# Patient Record
Sex: MALE | Race: WHITE | NOT HISPANIC OR LATINO | Employment: STUDENT | URBAN - METROPOLITAN AREA
[De-identification: names, ages, dates, MRNs, and addresses within clinical notes are randomized per-mention and may not be internally consistent; named-entity substitution may affect disease eponyms.]

---
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
## 2020-06-16 ENCOUNTER — OFFICE VISIT (OUTPATIENT)
Dept: ORTHOPEDIC SURGERY | Facility: CLINIC | Age: 20
End: 2020-06-16

## 2020-06-16 VITALS
HEIGHT: 73 IN | HEART RATE: 52 BPM | SYSTOLIC BLOOD PRESSURE: 133 MMHG | BODY MASS INDEX: 27.7 KG/M2 | DIASTOLIC BLOOD PRESSURE: 83 MMHG | WEIGHT: 209 LBS

## 2020-06-16 DIAGNOSIS — S63.621A SPRAIN OF INTERPHALANGEAL JOINT OF RIGHT THUMB, INITIAL ENCOUNTER: Primary | ICD-10-CM

## 2020-06-16 PROCEDURE — 99203 OFFICE O/P NEW LOW 30 MIN: CPT | Performed by: FAMILY MEDICINE

## 2020-06-16 NOTE — PROGRESS NOTES
"Primary Care Sports Medicine Office Visit Note     Patient ID: Nico Gamboa is a 19 y.o. male.    Chief Complaint:  Chief Complaint   Patient presents with   • Right Hand - Pain     HPI:    Mr. Nico Gamboa is a 19 y.o. male who presents to the clinic today for R thumb injury. Pt states he was playing baseball yesterday afternoon. Is a catcher, and free hand (R hand) was resting on his thigh, when the batter tipped the ball. Ball struck him in the tip of the R thumb, jamming the digit into his MTP, against extension resistance. Moderate pain, bruising to the thumb near immediately. Now difficulty with extending first interphalangeal joint.    History reviewed. No pertinent past medical history.    Past Surgical History:   Procedure Laterality Date   • HERNIA REPAIR         No family history on file.  Social History     Occupational History   • Not on file   Tobacco Use   • Smoking status: Never Smoker   • Smokeless tobacco: Never Used   Substance and Sexual Activity   • Alcohol use: Defer     Frequency: Never   • Drug use: Defer   • Sexual activity: Defer      Review of Systems   Constitutional: Negative for activity change and fever.   Respiratory: Negative for cough and shortness of breath.    Cardiovascular: Negative for chest pain.   Gastrointestinal: Negative for constipation, diarrhea, nausea and vomiting.   Musculoskeletal: Positive for arthralgias.   Skin: Negative for color change and rash.   Neurological: Negative for weakness.   Hematological: Does not bruise/bleed easily.       Objective:    /83   Pulse 52   Ht 185.4 cm (73\")   Wt 94.8 kg (209 lb)   BMI 27.57 kg/m²     Physical Examination:  Physical Exam   Constitutional: He appears well-developed and well-nourished. No distress.   HENT:   Head: Normocephalic and atraumatic.   Eyes: Conjunctivae are normal.   Cardiovascular: Intact distal pulses.   Pulmonary/Chest: Effort normal. No respiratory distress.   Neurological: He is alert. " "  Skin: Skin is warm. Capillary refill takes less than 2 seconds. He is not diaphoretic.   Nursing note and vitals reviewed.    Ortho Exam  Imaging and other tests:  Outside images 3 view of the wrist and hand XR dated 6/15/2020 yields no obvious fracture per my interpretation.  Essentially negative XR.    Assessment and Plan:    1. Sprain of interphalangeal joint of right thumb, initial encounter    I discussed with the patient today that reviewing his imaging, I do not feel that he has an acute or obvious fracture that I can tell.  I would like for him to initially wear fracture brace (Exos) for the next 2 weeks.  We can repeat imaging at that time.  If no obvious new bony turnover is apparent, after 2 weeks of rest, ice, NSAIDs, he likely can return to sport without any further intervention.  Likely simple sprain of interphalangeal joint.  RTC in 2 weeks for repeat imaging.    Rudi NAIK \"Chance\" Marcelo SALOMON DO, CAQSM  06/23/20  11:02    Disclaimer: Please note that areas of this note were completed with computer voice recognition software.  Quite often unanticipated grammatical, syntax, homophones, and other interpretive errors are inadvertently transcribed by the computer software. Please excuse any errors that have escaped final proofreading.  "

## 2020-06-29 ENCOUNTER — OFFICE VISIT (OUTPATIENT)
Dept: ORTHOPEDIC SURGERY | Facility: CLINIC | Age: 20
End: 2020-06-29

## 2020-06-29 VITALS
TEMPERATURE: 97.8 F | SYSTOLIC BLOOD PRESSURE: 125 MMHG | HEART RATE: 56 BPM | RESPIRATION RATE: 16 BRPM | BODY MASS INDEX: 27.83 KG/M2 | WEIGHT: 210 LBS | HEIGHT: 73 IN | DIASTOLIC BLOOD PRESSURE: 75 MMHG | OXYGEN SATURATION: 97 %

## 2020-06-29 DIAGNOSIS — S63.621A SPRAIN OF INTERPHALANGEAL JOINT OF RIGHT THUMB, INITIAL ENCOUNTER: Primary | ICD-10-CM

## 2020-06-29 PROCEDURE — 99213 OFFICE O/P EST LOW 20 MIN: CPT | Performed by: FAMILY MEDICINE

## 2020-06-29 NOTE — PROGRESS NOTES
"Primary Care Sports Medicine Office Visit Note     Patient ID: Nico Gamboa is a 19 y.o. male.    Chief Complaint:  Chief Complaint   Patient presents with   • Right Thumb - Follow-up     HPI:    Mr. Nico Gamboa is a 19 y.o. male who presents to the clinic today for follow-up evaluation and repeat imaging of right first distal phalanx injury.  The patient was playing baseball near 2 weeks ago, and was struck in the tip of the thumb while playing catcher.  He denied any weakness or strength loss to any motion of that finger, but had a significantly large subungual hematoma and a moderate amount of pain to palpation of the base of the distal phalanx.  He returns today for follow-up imaging as his first imaging was inconclusive/negative for fracture.  He was placed in a thumb spica brace, and has done incredibly well.  He states today he has full function of the thumb with no pain, weakness, neurologic symptom, or other problems.    No past medical history on file.    Past Surgical History:   Procedure Laterality Date   • HERNIA REPAIR         No family history on file.  Social History     Occupational History   • Not on file   Tobacco Use   • Smoking status: Never Smoker   • Smokeless tobacco: Never Used   Substance and Sexual Activity   • Alcohol use: Defer     Frequency: Never   • Drug use: Defer   • Sexual activity: Defer      Review of Systems   Constitutional: Negative for activity change, fatigue and fever.   Musculoskeletal: Negative for arthralgias.   Skin: Negative for color change and rash.   Neurological: Negative for numbness.       Objective:    /75 (BP Location: Left arm, Patient Position: Sitting)   Pulse 56   Temp 97.8 °F (36.6 °C)   Resp 16   Ht 185.4 cm (73\")   Wt 95.3 kg (210 lb)   SpO2 97%   BMI 27.71 kg/m²     Physical Examination:  Physical Exam   Constitutional: He appears well-developed and well-nourished. No distress.   HENT:   Head: Normocephalic and atraumatic. " "  Eyes: Conjunctivae are normal.   Cardiovascular: Intact distal pulses.   Pulmonary/Chest: Effort normal. No respiratory distress.   Neurological: He is alert.   Skin: Skin is warm. Capillary refill takes less than 2 seconds. He is not diaphoretic.   Nursing note and vitals reviewed.    Right Hand Exam     Tenderness   The patient is experiencing no tenderness.     Range of Motion   Hand   MP Thumb: normal   DIP Thumb: normal     Comments:  5/5 strength to thumb flexion/extension, abduction/adduction. Reabsorbing bruising and subungual hematoma of the first nailbed.          Imaging and other tests:  Repeat three-view XR of the right first digit again yields no obvious fracture, malalignment, or dislocation.  Essentially negative XR.    Assessment and Plan:    1. Sprain of interphalangeal joint of right thumb, initial encounter  - XR Finger 2+ View Right    With negative follow-up x-ray, this likely is simply interphalangeal joint sprain, with subungual hematoma.  The patient has done incredibly well with bracing.  I recommend he discontinue the brace today, ibuprofen as needed, RTC PRN.    Addendum: After this patient had left the office and upon further review, radiology read returned result from XR of very small nondisplaced fracture of distal phalanx.   Vivek called the patient and discussed these findings with him.  I would like for him to continue wearing his Exos fracture brace for another 4 weeks, and return to clinic at that time for repeat evaluation.    Rudi NAIK \"Chance\" Marcelo SALOMON DO, CAQSM  06/29/20  11:57    Disclaimer: Please note that areas of this note were completed with computer voice recognition software.  Quite often unanticipated grammatical, syntax, homophones, and other interpretive errors are inadvertently transcribed by the computer software. Please excuse any errors that have escaped final proofreading.  "

## 2021-02-09 ENCOUNTER — OFFICE VISIT (OUTPATIENT)
Dept: ORTHOPEDIC SURGERY | Facility: CLINIC | Age: 21
End: 2021-02-09

## 2021-02-09 VITALS
SYSTOLIC BLOOD PRESSURE: 140 MMHG | HEIGHT: 73 IN | WEIGHT: 225 LBS | DIASTOLIC BLOOD PRESSURE: 86 MMHG | HEART RATE: 65 BPM | BODY MASS INDEX: 29.82 KG/M2

## 2021-02-09 DIAGNOSIS — S69.91XA INJURY OF RIGHT HAND, INITIAL ENCOUNTER: Primary | ICD-10-CM

## 2021-02-09 PROCEDURE — 99213 OFFICE O/P EST LOW 20 MIN: CPT | Performed by: ORTHOPAEDIC SURGERY

## 2021-02-09 NOTE — PROGRESS NOTES
"Chief Complaint  Follow-up of the Right Hand    Subjective    History of Present Illness      Nico Gamboa is a 20 y.o. male who presents to [facility] for right hand pain after a sports injury.  History of Present Illness this is a young man who is attending [employer].  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  He was injured about 4 days ago when he was swinging a bat with significant velocity.  He had to check his swing which led to immediate pain after a pop on the medial aspect of his wrist.  He has difficulty with  strength.  He has difficulty with ulnar deviation of the wrist.  I am definitely concerned about an injury to the ulnar collateral ligament versus the hook of the hamate.  Pain Location:  RIGHT wrist  Radiation: none  Quality: sharp, stabbing  Intensity/Severity: moderate  Duration: 4 days  Progression of symptoms: no worsening, symptoms stable/unchanged  Onset quality: sudden  Timing: constant  Aggravating Factors: repetitive motion  Alleviating Factors: Tylenol, ice  Previous Episodes: none mentioned  Associated Symptoms: pain, swelling, decreased strength  ADLs Affected: work related activities, recreational activities/sports  Previous Treatment: NSAIDs       Objective   Vital Signs:   /86   Pulse 65   Ht 185.4 cm (73\")   Wt 102 kg (225 lb)   BMI 29.69 kg/m²     Physical Exam  Physical Exam  Vitals signs and nursing note reviewed.   Constitutional:       Appearance: Normal appearance.   Pulmonary:      Effort: Pulmonary effort is normal.   Skin:     General: Skin is warm and dry.      Capillary Refill: Capillary refill takes less than 2 seconds.   Neurological:      General: No focal deficit present.      Mental Status: He is alert and oriented to person, place, and time. Mental status is at " baseline.   Psychiatric:         Mood and Affect: Mood normal.         Behavior: Behavior normal.         Thought Content: Thought content normal.         Judgment: Judgment normal.     Ortho Exam right wrist: The patient is right-hand dominant.  He has very significant swelling on the ulnar side of his wrist.  The DRUJ appears to be stable.  He has point tenderness over the volar aspect of the wrist over the location of the hook of the hamate bone.  Radial artery pulses are palpable.  He does not have any neurological deficit related to the median of the ulnar nerves.  There is no clinical evidence of a compartmental syndrome.  He cannot make a fist because of the pain and swelling ulnarly.  On the radial side of the wrist he does not have any pain or discomfort whatsoever.  The flexor and extensor tendon function is well preserved.        Result Review :   The following data was reviewed by: Colt Singh MD on 02/09/2021:    xrays obtained today  right Wrist X-Ray  Indication: Pain after baseball hitting injury.  AP, Lateral views  Findings: There is a questionable fracture of the hook of the hamate.  Otherwise the bony architecture is well-preserved.  no bony lesion  Soft tissues within normal limits  within normal limits joint spaces  Hardware appropriately positioned not applicable      no prior studies available for comparison.    X-RAY was ordered and reviewed by Colt Singh MD          Procedures           Assessment   Assessment and Plan    Problem List Items Addressed This Visit        Musculoskeletal and Injuries    Injury of right hand - Primary    Relevant Orders    XR Hand 3+ View Right (Completed)    MRI Wrist Right Without Contrast          Follow Up   · Compression/brace in the form of an XO brace to immobilize the wrist.  · If he does have a nondisplaced fracture of the base of the hamate hook then that can be treated with nonoperative management.  · We are going to go ahead and get an MRI of  this wrist ASAP because this young man is anxious to return back to the baseball rafael.  · I have discussed with him that if the MRI is positive for a fracture or injury to the ulnar collateral ligament or the TFCC, I would then have to recommend for him to go see a hand surgery specialist.  The patient understands that and is agreeable to that line of management.  · Rest, ice, compression, and elevation (RICE) therapy  · Stretching and strengthening exercises of the fingers and the elbow.  · OTC Tylenol 500-1000mg by mouth every 6 hours as needed for pain   · Follow up in 1 week(s) for discussion regarding the MRI findings.  · This patient is not allowed to go back to baseball just yet.  Even if he does not have a fracture I would like the soft tissues to heal for at least 4 to 6 weeks before we let him resume his athletic activities.  • Patient was given instructions and counseling regarding his condition or for health maintenance advice. Please see specific information pulled into the AVS if appropriate.     Colt Singh MD   Date of Encounter: 2/9/2021   Electronically signed by Colt Singh MD, 02/09/21, 3:55 PM EST.     EMR Dragon/Transcription disclaimer:  Much of this encounter note is an electronic transcription/translation of spoken language to printed text. The electronic translation of spoken language may permit erroneous, or at times, nonsensical words or phrases to be inadvertently transcribed; Although I have reviewed the note for such errors, some may still exist.

## 2021-02-10 ENCOUNTER — OFFICE VISIT (OUTPATIENT)
Dept: ORTHOPEDIC SURGERY | Facility: CLINIC | Age: 21
End: 2021-02-10

## 2021-02-10 VITALS
DIASTOLIC BLOOD PRESSURE: 83 MMHG | SYSTOLIC BLOOD PRESSURE: 125 MMHG | BODY MASS INDEX: 29.82 KG/M2 | HEART RATE: 64 BPM | HEIGHT: 73 IN | WEIGHT: 225 LBS

## 2021-02-10 DIAGNOSIS — S69.91XA INJURY OF RIGHT HAND, INITIAL ENCOUNTER: Primary | ICD-10-CM

## 2021-02-10 PROCEDURE — 0232T NJX PLATELET PLASMA: CPT | Performed by: ORTHOPAEDIC SURGERY

## 2021-02-10 NOTE — PROGRESS NOTES
"     Patient ID: Nico Gamboa is a 20 y.o. male.  Right hand pain  Nico is a follow-up with my partner reducing about 20 developed acute pain in the wrist and hand area.  He was found to have hamate fracture.  His family inquired about PRP injection and is here to proceed with that      Objective:    /83   Pulse 64   Ht 185.4 cm (73\")   Wt 102 kg (225 lb)   BMI 29.69 kg/m²     Physical Examination:    Right hand demonstrates bruising and tenderness maximally along the hook of the hamate.  No deformity with full range motion of the digits,Sensory and motor exam are intact all distributions. Radial pulse is palpable and capillary refill is less than two seconds to all digits    Imaging:  MRI demonstrates nondisplaced hook of the hamate fracture    Assessment:  Hook of the hamate fracture right hand    Plan:  Discussed indications and rationale for PRP again with the patient today.  He would like to proceed.  Under sterile technique and after verbal consent I injected 5 cc of ACP into the hamate area after aspiration upon feeling bony contact.  He tolerated it well.  See me us in 3 weeks for x-rays, continue wrist immobilization    Procedures  "

## 2021-03-03 ENCOUNTER — TELEPHONE (OUTPATIENT)
Dept: ORTHOPEDIC SURGERY | Facility: CLINIC | Age: 21
End: 2021-03-03

## 2021-03-03 ENCOUNTER — OFFICE VISIT (OUTPATIENT)
Dept: ORTHOPEDIC SURGERY | Facility: CLINIC | Age: 21
End: 2021-03-03

## 2021-03-03 VITALS
SYSTOLIC BLOOD PRESSURE: 133 MMHG | WEIGHT: 225 LBS | HEIGHT: 73 IN | DIASTOLIC BLOOD PRESSURE: 84 MMHG | BODY MASS INDEX: 29.82 KG/M2 | HEART RATE: 57 BPM

## 2021-03-03 DIAGNOSIS — S69.91XA INJURY OF RIGHT HAND, INITIAL ENCOUNTER: Primary | ICD-10-CM

## 2021-03-03 DIAGNOSIS — S62.154D CLOSED NONDISPLACED FRACTURE OF HOOK PROCESS OF HAMATE OF RIGHT WRIST WITH ROUTINE HEALING, SUBSEQUENT ENCOUNTER: ICD-10-CM

## 2021-03-03 PROCEDURE — 99213 OFFICE O/P EST LOW 20 MIN: CPT | Performed by: ORTHOPAEDIC SURGERY

## 2021-03-03 NOTE — TELEPHONE ENCOUNTER
PT advised to go to Dr. Sexton office at [address] [address] [address], [phone], PT voiced understanding and had no further questions.

## 2021-03-03 NOTE — PROGRESS NOTES
"     Patient ID: Nico Gamboa is a 20 y.o. male.  Right hand pain  Nico is a 20-year-old gentleman here with right hook of the hamate fracture.  He had a PRP injection on February 10.  Date of injury is approximately February 9    Review of Systems:  Right hand pain      Objective:    /84   Pulse 57   Ht 185.4 cm (73\")   Wt 102 kg (225 lb)   BMI 29.69 kg/m²     Physical Examination:     Right hand demonstrates intact skin.  There is still tenderness over the hook of the hamate.  He has forage motion of the digits.Sensory and motor exam are intact all distributions. Radial pulse is palpable and capillary refill is less than two seconds to all digits    Imaging:   right Hand X-Ray  Indication: Hook of the hamate fracture suffered about 3 weeks ago  AP, Oblique, Lateral views  Findings: Nondisplaced hook of the hamate fracture  no bony lesion  Soft tissues normal  not examined joint spaces  Hardware appropriately positioned not applicable      yes prior studies available for comparison.    Assessment:    Hook of the hamate fracture now with visible fracture line    Plan:   Treatment options discussed.  I will arrange consultation with a hand surgeon to see if surgical excision would be reasonable option for return to play this season.  Also discussed the continued conservative treatment may be the best option pending that evaluation, will make arrangements for referral ASAP      Procedures          Disclaimer: Please note that areas of this note were completed with computer voice recognition software.  Quite often unanticipated grammatical, syntax, homophones, and other interpretive errors are inadvertently transcribed by the computer software. Please excuse any errors that have escaped final proofreading.  "

## 2021-10-07 ENCOUNTER — OFFICE VISIT (OUTPATIENT)
Dept: ORTHOPEDIC SURGERY | Facility: CLINIC | Age: 21
End: 2021-10-07

## 2021-10-07 VITALS
SYSTOLIC BLOOD PRESSURE: 147 MMHG | DIASTOLIC BLOOD PRESSURE: 90 MMHG | WEIGHT: 230.2 LBS | HEIGHT: 73 IN | HEART RATE: 57 BPM | BODY MASS INDEX: 30.51 KG/M2

## 2021-10-07 DIAGNOSIS — S39.012A STRAIN OF LUMBAR REGION, INITIAL ENCOUNTER: ICD-10-CM

## 2021-10-07 DIAGNOSIS — M25.552 LEFT HIP PAIN: Primary | ICD-10-CM

## 2021-10-07 PROCEDURE — 99213 OFFICE O/P EST LOW 20 MIN: CPT | Performed by: ORTHOPAEDIC SURGERY

## 2021-10-07 RX ORDER — METHYLPREDNISOLONE 4 MG/1
TABLET ORAL
Qty: 21 TABLET | Refills: 0 | Status: SHIPPED | OUTPATIENT
Start: 2021-10-07 | End: 2021-10-16

## 2021-10-07 NOTE — PROGRESS NOTES
"     Patient ID: Nico Gamboa is a 21 y.o. male.    Chief Complaint:    Chief Complaint   Patient presents with   • Left Hip - Initial Evaluation       HPI:  Nico is a 21-year-old  at [employer] with about a week of low back pain.  He does not recall specific injury.  He has been weightlifting though.  Pain is mainly present over the left side of the lumbosacral area.  It refers to the buttock.  No pain in the groin.  No radiating pain.  Has taken ibuprofen and rested without relief  History reviewed. No pertinent past medical history.    Past Surgical History:   Procedure Laterality Date   • HERNIA REPAIR         History reviewed. No pertinent family history.       Social History     Occupational History   • Not on file   Tobacco Use   • Smoking status: Never Smoker   • Smokeless tobacco: Never Used   Vaping Use   • Vaping Use: Never used   Substance and Sexual Activity   • Alcohol use: Defer   • Drug use: Defer   • Sexual activity: Defer      Review of Systems   Cardiovascular: Negative for chest pain.   Musculoskeletal: Positive for arthralgias.       Objective:    /90   Pulse 57   Ht 185.4 cm (73\")   Wt 104 kg (230 lb 3.2 oz)   BMI 30.37 kg/m²     Physical Examination:  Low back demonstrates diffuse paraspinal tenderness from L4-S1.  He has mildly reduced lumbar flexion extension.  The hip demonstrates no tenderness.  Hip flexion 90 external rotation 70 internal rotation 30 with mild pain on impingement testing.  Reflexes are symmetric.  Sensory and motor exam are intact in all distributions. Dorsalis pedis and posterior tibialis pulses are palpable and capillary refill is less than two seconds to all digits.    Imaging:  left Hip X-Ray  Indication: Low back pain with weight lifting  AP and lateral  Findings: Well-maintained hip joint space with cam impingement  no bony lesion  Soft tissues normal  normal joint spaces  Hardware appropriately positioned not applicable      no prior " studies available for comparison.    Assessment:  Lumbar strain    Plan:  I recommend activity modification and strengthening conditioning program, wrote for Medrol pack and therapy.  See me as needed  Return to training as symptoms subside      Procedures         Disclaimer: Please note that areas of this note were completed with computer voice recognition software.  Quite often unanticipated grammatical, syntax, homophones, and other interpretive errors are inadvertently transcribed by the computer software. Please excuse any errors that have escaped final proofreading.

## 2021-10-13 DIAGNOSIS — M54.16 LUMBAR RADICULOPATHY: ICD-10-CM

## 2021-10-13 DIAGNOSIS — M54.50 ACUTE LOW BACK PAIN, UNSPECIFIED BACK PAIN LATERALITY, UNSPECIFIED WHETHER SCIATICA PRESENT: Primary | ICD-10-CM

## 2021-10-16 ENCOUNTER — OFFICE VISIT (OUTPATIENT)
Dept: ORTHOPEDIC SURGERY | Facility: CLINIC | Age: 21
End: 2021-10-16

## 2021-10-16 VITALS
HEIGHT: 73 IN | DIASTOLIC BLOOD PRESSURE: 86 MMHG | HEART RATE: 86 BPM | BODY MASS INDEX: 30.48 KG/M2 | WEIGHT: 230 LBS | SYSTOLIC BLOOD PRESSURE: 124 MMHG

## 2021-10-16 DIAGNOSIS — M54.50 ACUTE LOW BACK PAIN, UNSPECIFIED BACK PAIN LATERALITY, UNSPECIFIED WHETHER SCIATICA PRESENT: Primary | ICD-10-CM

## 2021-10-16 PROCEDURE — 99213 OFFICE O/P EST LOW 20 MIN: CPT | Performed by: ORTHOPAEDIC SURGERY

## 2021-10-16 RX ORDER — MELOXICAM 15 MG/1
15 TABLET ORAL DAILY PRN
Qty: 30 TABLET | Refills: 4 | Status: SHIPPED | OUTPATIENT
Start: 2021-10-16 | End: 2021-11-11

## 2021-10-16 NOTE — PROGRESS NOTES
"     Patient ID: Nico Gamboa is a 21 y.o. male.  Low back pain  Nico is a 19-year-old  here with low back pain.  He has done some rehab with his  as well as took a steroid pack which helped.  However as he returned to activity and lifting pain is returned with referral to the left buttock.  No significant numbness or tingling    Review of Systems:    Left leg and low back pain    Objective:    /86   Pulse 86   Ht 185.4 cm (73\")   Wt 104 kg (230 lb)   BMI 30.34 kg/m²     Physical Examination:      Low back demonstrates diffuse paraspinal tenderness from L4-S1.  He has mildly reduced lumbar flexion extension.  The hip demonstrates no tenderness.  Hip flexion 90 external rotation 70 internal rotation 30 with mild pain on impingement testing.  Reflexes are symmetric.  Sensory and motor exam are intact in all distributions. Dorsalis pedis and posterior tibialis pulses are palpable and capillary refill is less than two seconds to all digits    Imaging:   MRI demonstrates very mild foraminal stenosis at L5-S1 on the right side    Assessment:    Low back pain mild spinal stenosis    Plan:   I recommend backing completely off of baseball activities as well as strengthening conditioning to focus on formal back physical therapy.  Wrote for meloxicam.  See me in 4 to 6 weeks      Procedures          Disclaimer: Please note that areas of this note were completed with computer voice recognition software.  Quite often unanticipated grammatical, syntax, homophones, and other interpretive errors are inadvertently transcribed by the computer software. Please excuse any errors that have escaped final proofreading.  "

## 2021-11-08 ENCOUNTER — TELEPHONE (OUTPATIENT)
Dept: ORTHOPEDICS | Facility: OTHER | Age: 21
End: 2021-11-08

## 2021-11-08 ENCOUNTER — TELEPHONE (OUTPATIENT)
Dept: ORTHOPEDIC SURGERY | Facility: CLINIC | Age: 21
End: 2021-11-08

## 2021-11-08 NOTE — TELEPHONE ENCOUNTER
Provider:   Caller: NARA DUNHAM  Relationship to Patient: SELF  Phone Number: [phone]  Reason for Call: PATIENT TRYING TO SEE IF HE CAN BE SEEN EARLIER FOR EARLY CLEARANCE. STATES HE IS FEELING BETTER AND IS DONE WITH PHYSICAL THERAPY. REQUESTING A CALL BACK. PLEASE LEAVE VM IF NO ANSWER.

## 2021-11-11 ENCOUNTER — OFFICE VISIT (OUTPATIENT)
Dept: ORTHOPEDIC SURGERY | Facility: CLINIC | Age: 21
End: 2021-11-11

## 2021-11-11 VITALS
HEART RATE: 74 BPM | BODY MASS INDEX: 30.48 KG/M2 | SYSTOLIC BLOOD PRESSURE: 149 MMHG | HEIGHT: 73 IN | WEIGHT: 230 LBS | DIASTOLIC BLOOD PRESSURE: 82 MMHG

## 2021-11-11 DIAGNOSIS — M54.50 ACUTE LOW BACK PAIN, UNSPECIFIED BACK PAIN LATERALITY, UNSPECIFIED WHETHER SCIATICA PRESENT: Primary | ICD-10-CM

## 2021-11-11 PROCEDURE — 99212 OFFICE O/P EST SF 10 MIN: CPT | Performed by: ORTHOPAEDIC SURGERY

## 2021-11-11 NOTE — PROGRESS NOTES
"     Patient ID: Nico Gamboa is a 21 y.o. male.  Low back pain  Nico follows up with mild spinal stenosis of the lumbar spine.  He has been on meloxicam and physical therapy for the last month  Pain completely resolved  Review of Systems:  Low back pain      Objective:    /82   Pulse 74   Ht 185.4 cm (73\")   Wt 104 kg (230 lb)   BMI 30.34 kg/m²     Physical Examination:      Low back demonstrates no paraspinal tenderness from L4-S1.  He has normal spine range of motion.   Reflexes are symmetric.  Sensory and motor exam are intact in all distributions. Dorsalis pedis and posterior tibialis pulses are palpable and capillary refill is less than two seconds to all digits       Imaging:       Assessment:    Resolving low back pain    Plan:   Activity as tolerated and see me as needed      Procedures          Disclaimer: Please note that areas of this note were completed with computer voice recognition software.  Quite often unanticipated grammatical, syntax, homophones, and other interpretive errors are inadvertently transcribed by the computer software. Please excuse any errors that have escaped final proofreading.  "

## 2021-12-03 ENCOUNTER — TELEPHONE (OUTPATIENT)
Dept: ORTHOPEDIC SURGERY | Facility: CLINIC | Age: 21
End: 2021-12-03

## 2021-12-03 NOTE — TELEPHONE ENCOUNTER
Caller: Nico Gamboa    Relationship to patient: Self    Best call back number: [phone]     Chief complaint: WILL DR. WIGGINS SEE ISSUE BELOW?    Type of visit: NEW PROBLEM / SPORTS INJURY - LEFT SIDED PELVIC HERNIA / DOI TODAY 12/03/21 (~0800) / NO IMAGING YET (NOR CONFIRMED DIAGNOSIS) / NO PRIOR LEFT SIDE PELVIC SURGERY, BUT HAD PRIOR RIGHT SIDE PELVIC SURGERY ~2010-11 @[facility] (SURGEON UNKNOWN)     Requested date: ASAP- PATIENT STATED HE FEELS AN ACHING, PULLING SENSATION FROM FRONT OF PELVIS BACK TOWARD BUTTOCKS WHEN PATIENT FLEXES HIS LEFT HIP (WHEN HE BRINGS HIS LEFT KNEE UP TOWARD HIS CHEST)     FEELS THE SAME AS WHEN PATIENT HAD RIGHT SIDE PELVIC HERNIA DEVELOP    Additional notes: IF DR. WIGGINS DOES NOT SEE FOR THIS ISSUE, PLEASE NOTIFY PATIENT OF WHO DR. WIGGINS WOULD SUGGEST PATIENT SEE FOR TREATMENT?     PATIENT'S Best call back number: [phone]     THANKS

## 2025-05-12 ENCOUNTER — OFFICE VISIT (OUTPATIENT)
Dept: ORTHOPEDIC SURGERY | Facility: CLINIC | Age: 25
End: 2025-05-12
Payer: COMMERCIAL

## 2025-05-12 VITALS — HEIGHT: 73 IN | HEART RATE: 62 BPM | OXYGEN SATURATION: 99 % | BODY MASS INDEX: 33.13 KG/M2 | WEIGHT: 250 LBS

## 2025-05-12 DIAGNOSIS — M79.672 LEFT FOOT PAIN: ICD-10-CM

## 2025-05-12 DIAGNOSIS — M25.562 LEFT KNEE PAIN, UNSPECIFIED CHRONICITY: Primary | ICD-10-CM

## 2025-05-12 PROCEDURE — 99203 OFFICE O/P NEW LOW 30 MIN: CPT | Performed by: FAMILY MEDICINE
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
## 2025-06-03 ENCOUNTER — HOSPITAL ENCOUNTER (OUTPATIENT)
Dept: MRI IMAGING | Facility: HOSPITAL | Age: 25
Discharge: HOME | End: 2025-06-03
Admitting: FAMILY MEDICINE
Payer: OTHER MISCELLANEOUS

## 2025-06-03 DIAGNOSIS — M79.672 LEFT FOOT PAIN: ICD-10-CM

## 2025-06-03 PROCEDURE — 73718 MRI LOWER EXTREMITY W/O DYE: CPT

## 2025-06-12 ENCOUNTER — OFFICE VISIT (OUTPATIENT)
Dept: ORTHOPEDIC SURGERY | Facility: CLINIC | Age: 25
End: 2025-06-12
Payer: OTHER MISCELLANEOUS

## 2025-06-12 VITALS — HEART RATE: 83 BPM | WEIGHT: 250 LBS | HEIGHT: 73 IN | OXYGEN SATURATION: 98 % | BODY MASS INDEX: 33.13 KG/M2

## 2025-06-12 DIAGNOSIS — M79.672 LEFT FOOT PAIN: Primary | ICD-10-CM
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
## 2025-06-17 ENCOUNTER — TELEPHONE (OUTPATIENT)
Dept: ORTHOPEDIC SURGERY | Facility: CLINIC | Age: 25
End: 2025-06-17

## 2025-06-17 NOTE — TELEPHONE ENCOUNTER
Caller: GRETEL    Relationship: YURIDIA HUSSEIN    Best call back number:     What form or medical record are you requesting: OFFICE NOTES AND WORK STATUS FROM 6/3/25 D6/12/25      How would you like to receive the form or medical records (pick-up, mail, fax): FAX  If fax, what is the fax number:  [facility] CLAIM NUMBER [id number]  I